# Patient Record
Sex: FEMALE | Race: OTHER | HISPANIC OR LATINO | URBAN - METROPOLITAN AREA
[De-identification: names, ages, dates, MRNs, and addresses within clinical notes are randomized per-mention and may not be internally consistent; named-entity substitution may affect disease eponyms.]

---

## 2018-02-01 ENCOUNTER — OUTPATIENT (OUTPATIENT)
Dept: OUTPATIENT SERVICES | Facility: HOSPITAL | Age: 23
LOS: 1 days | End: 2018-02-01
Payer: MEDICAID

## 2018-02-01 PROCEDURE — G9001: CPT

## 2018-02-16 ENCOUNTER — EMERGENCY (EMERGENCY)
Facility: HOSPITAL | Age: 23
LOS: 1 days | Discharge: HOME | End: 2018-02-16
Attending: EMERGENCY MEDICINE | Admitting: EMERGENCY MEDICINE
Payer: MEDICAID

## 2018-02-16 VITALS
DIASTOLIC BLOOD PRESSURE: 94 MMHG | TEMPERATURE: 98 F | SYSTOLIC BLOOD PRESSURE: 153 MMHG | RESPIRATION RATE: 18 BRPM | OXYGEN SATURATION: 100 % | HEART RATE: 99 BPM

## 2018-02-16 VITALS
DIASTOLIC BLOOD PRESSURE: 80 MMHG | SYSTOLIC BLOOD PRESSURE: 128 MMHG | OXYGEN SATURATION: 99 % | HEART RATE: 90 BPM | RESPIRATION RATE: 18 BRPM | TEMPERATURE: 98 F

## 2018-02-16 PROCEDURE — 99284 EMERGENCY DEPT VISIT MOD MDM: CPT | Mod: 25

## 2018-02-16 PROCEDURE — 70450 CT HEAD/BRAIN W/O DYE: CPT

## 2018-02-16 PROCEDURE — 70450 CT HEAD/BRAIN W/O DYE: CPT | Mod: 26

## 2018-02-16 RX ORDER — ACETAMINOPHEN 500 MG
975 TABLET ORAL ONCE
Refills: 0 | Status: COMPLETED | OUTPATIENT
Start: 2018-02-16 | End: 2018-02-16

## 2018-02-16 RX ORDER — OXYCODONE AND ACETAMINOPHEN 5; 325 MG/1; MG/1
1 TABLET ORAL ONCE
Refills: 0 | Status: DISCONTINUED | OUTPATIENT
Start: 2018-02-16 | End: 2018-02-16

## 2018-02-16 RX ADMIN — Medication 975 MILLIGRAM(S): at 03:25

## 2018-02-16 NOTE — ED PROVIDER NOTE - NS ED ROS FT
ROS: denies weakness, fevers/chills, nausea/vomiting, chest pain, SOB, diaphoresis, abdominal pain, back/neck pain, dysuria/hematuria, or rash  +abrasion, HA, dizziness

## 2018-02-16 NOTE — ED PROVIDER NOTE - PHYSICAL EXAMINATION
Gen: Well appearing, NAD  Head: Small [id number] hematoma to right side occiput  HEENT: MMM, Normal conjunctiva  Lung: CTAB, no rales, rhonchi or wheezing  CV: RRR, no murmurs, rubs or gallops  Abd: soft, NTND, no rebound or guarding  MSK: No CVA tenderness. No edema, no visible deformities  Neuro: No focal neurologic deficits. CN II-XII intact. Normal strength and sensation x 4  Skin: Warm and dry. Right flank abrasion, no active bleeding  Psych: normal mood and affect, A&Ox3
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
## 2018-02-20 DIAGNOSIS — R69 ILLNESS, UNSPECIFIED: ICD-10-CM

## 2018-06-18 ENCOUNTER — EMERGENCY (EMERGENCY)
Facility: HOSPITAL | Age: 23
LOS: 1 days | Discharge: HOME | End: 2018-06-18
Attending: EMERGENCY MEDICINE | Admitting: EMERGENCY MEDICINE
Payer: MEDICAID

## 2018-06-18 VITALS
RESPIRATION RATE: 16 BRPM | OXYGEN SATURATION: 99 % | DIASTOLIC BLOOD PRESSURE: 96 MMHG | SYSTOLIC BLOOD PRESSURE: 146 MMHG | HEART RATE: 113 BPM | TEMPERATURE: 98 F

## 2018-06-18 VITALS
DIASTOLIC BLOOD PRESSURE: 68 MMHG | HEART RATE: 108 BPM | SYSTOLIC BLOOD PRESSURE: 135 MMHG | RESPIRATION RATE: 18 BRPM | OXYGEN SATURATION: 100 %

## 2018-06-18 LAB
ALBUMIN SERPL ELPH-MCNC: 5 G/DL — SIGNIFICANT CHANGE UP (ref 3.3–5)
ALP SERPL-CCNC: 72 U/L — SIGNIFICANT CHANGE UP (ref 40–120)
ALT FLD-CCNC: 25 U/L — SIGNIFICANT CHANGE UP (ref 4–33)
AMPHET UR-MCNC: NEGATIVE — SIGNIFICANT CHANGE UP
APAP SERPL-MCNC: < 15 UG/ML — LOW (ref 15–25)
AST SERPL-CCNC: 32 U/L — SIGNIFICANT CHANGE UP (ref 4–32)
BARBITURATES UR SCN-MCNC: NEGATIVE — SIGNIFICANT CHANGE UP
BASE EXCESS BLDV CALC-SCNC: 0.7 MMOL/L — SIGNIFICANT CHANGE UP
BASOPHILS # BLD AUTO: 0.07 K/UL — SIGNIFICANT CHANGE UP (ref 0–0.2)
BASOPHILS NFR BLD AUTO: 0.6 % — SIGNIFICANT CHANGE UP (ref 0–2)
BENZODIAZ UR-MCNC: NEGATIVE — SIGNIFICANT CHANGE UP
BILIRUB SERPL-MCNC: 0.3 MG/DL — SIGNIFICANT CHANGE UP (ref 0.2–1.2)
BLOOD GAS VENOUS - CREATININE: 0.67 MG/DL — SIGNIFICANT CHANGE UP (ref 0.5–1.3)
BUN SERPL-MCNC: 14 MG/DL — SIGNIFICANT CHANGE UP (ref 7–23)
CALCIUM SERPL-MCNC: 9.8 MG/DL — SIGNIFICANT CHANGE UP (ref 8.4–10.5)
CANNABINOIDS UR-MCNC: NEGATIVE — SIGNIFICANT CHANGE UP
CHLORIDE BLDV-SCNC: 104 MMOL/L — SIGNIFICANT CHANGE UP (ref 96–108)
CHLORIDE SERPL-SCNC: 96 MMOL/L — LOW (ref 98–107)
CO2 SERPL-SCNC: 22 MMOL/L — SIGNIFICANT CHANGE UP (ref 22–31)
COCAINE METAB.OTHER UR-MCNC: NEGATIVE — SIGNIFICANT CHANGE UP
CREAT SERPL-MCNC: 0.67 MG/DL — SIGNIFICANT CHANGE UP (ref 0.5–1.3)
EOSINOPHIL # BLD AUTO: 0.03 K/UL — SIGNIFICANT CHANGE UP (ref 0–0.5)
EOSINOPHIL NFR BLD AUTO: 0.2 % — SIGNIFICANT CHANGE UP (ref 0–6)
ETHANOL BLD-MCNC: < 10 MG/DL — SIGNIFICANT CHANGE UP
GAS PNL BLDV: 132 MMOL/L — LOW (ref 136–146)
GLUCOSE BLDV-MCNC: 128 — HIGH (ref 70–99)
GLUCOSE SERPL-MCNC: 119 MG/DL — HIGH (ref 70–99)
HCO3 BLDV-SCNC: 25 MMOL/L — SIGNIFICANT CHANGE UP (ref 20–27)
HCT VFR BLD CALC: 40.1 % — SIGNIFICANT CHANGE UP (ref 34.5–45)
HCT VFR BLDV CALC: 43.8 % — SIGNIFICANT CHANGE UP (ref 34.5–45)
HGB BLD-MCNC: 13.5 G/DL — SIGNIFICANT CHANGE UP (ref 11.5–15.5)
HGB BLDV-MCNC: 14.3 G/DL — SIGNIFICANT CHANGE UP (ref 11.5–15.5)
IMM GRANULOCYTES # BLD AUTO: 0.05 # — SIGNIFICANT CHANGE UP
IMM GRANULOCYTES NFR BLD AUTO: 0.4 % — SIGNIFICANT CHANGE UP (ref 0–1.5)
LACTATE BLDV-MCNC: 2 MMOL/L — SIGNIFICANT CHANGE UP (ref 0.5–2)
LYMPHOCYTES # BLD AUTO: 1.86 K/UL — SIGNIFICANT CHANGE UP (ref 1–3.3)
LYMPHOCYTES # BLD AUTO: 15 % — SIGNIFICANT CHANGE UP (ref 13–44)
MCHC RBC-ENTMCNC: 31.8 PG — SIGNIFICANT CHANGE UP (ref 27–34)
MCHC RBC-ENTMCNC: 33.7 % — SIGNIFICANT CHANGE UP (ref 32–36)
MCV RBC AUTO: 94.6 FL — SIGNIFICANT CHANGE UP (ref 80–100)
METHADONE UR-MCNC: NEGATIVE — SIGNIFICANT CHANGE UP
MONOCYTES # BLD AUTO: 1.12 K/UL — HIGH (ref 0–0.9)
MONOCYTES NFR BLD AUTO: 9 % — SIGNIFICANT CHANGE UP (ref 2–14)
NEUTROPHILS # BLD AUTO: 9.28 K/UL — HIGH (ref 1.8–7.4)
NEUTROPHILS NFR BLD AUTO: 74.8 % — SIGNIFICANT CHANGE UP (ref 43–77)
NRBC # FLD: 0 — SIGNIFICANT CHANGE UP
OPIATES UR-MCNC: NEGATIVE — SIGNIFICANT CHANGE UP
OXYCODONE UR-MCNC: NEGATIVE — SIGNIFICANT CHANGE UP
PCO2 BLDV: 38 MMHG — LOW (ref 41–51)
PCP UR-MCNC: NEGATIVE — SIGNIFICANT CHANGE UP
PH BLDV: 7.43 PH — SIGNIFICANT CHANGE UP (ref 7.32–7.43)
PLATELET # BLD AUTO: 298 K/UL — SIGNIFICANT CHANGE UP (ref 150–400)
PMV BLD: 10.4 FL — SIGNIFICANT CHANGE UP (ref 7–13)
PO2 BLDV: 38 MMHG — SIGNIFICANT CHANGE UP (ref 35–40)
POTASSIUM BLDV-SCNC: 3.1 MMOL/L — LOW (ref 3.4–4.5)
POTASSIUM SERPL-MCNC: 3.2 MMOL/L — LOW (ref 3.5–5.3)
POTASSIUM SERPL-SCNC: 3.2 MMOL/L — LOW (ref 3.5–5.3)
PROT SERPL-MCNC: 8.2 G/DL — SIGNIFICANT CHANGE UP (ref 6–8.3)
RBC # BLD: 4.24 M/UL — SIGNIFICANT CHANGE UP (ref 3.8–5.2)
RBC # FLD: 12 % — SIGNIFICANT CHANGE UP (ref 10.3–14.5)
SALICYLATES SERPL-MCNC: < 5 MG/DL — LOW (ref 15–30)
SAO2 % BLDV: 67.3 % — SIGNIFICANT CHANGE UP (ref 60–85)
SODIUM SERPL-SCNC: 136 MMOL/L — SIGNIFICANT CHANGE UP (ref 135–145)
WBC # BLD: 12.41 K/UL — HIGH (ref 3.8–10.5)
WBC # FLD AUTO: 12.41 K/UL — HIGH (ref 3.8–10.5)

## 2018-06-18 PROCEDURE — 99285 EMERGENCY DEPT VISIT HI MDM: CPT

## 2018-06-18 RX ORDER — SODIUM CHLORIDE 9 MG/ML
1000 INJECTION INTRAMUSCULAR; INTRAVENOUS; SUBCUTANEOUS ONCE
Qty: 0 | Refills: 0 | Status: COMPLETED | OUTPATIENT
Start: 2018-06-18 | End: 2018-06-18

## 2018-06-18 RX ORDER — POTASSIUM CHLORIDE 20 MEQ
40 PACKET (EA) ORAL ONCE
Qty: 0 | Refills: 0 | Status: COMPLETED | OUTPATIENT
Start: 2018-06-18 | End: 2018-06-18

## 2018-06-18 RX ADMIN — SODIUM CHLORIDE 1000 MILLILITER(S): 9 INJECTION INTRAMUSCULAR; INTRAVENOUS; SUBCUTANEOUS at 18:28

## 2018-06-18 RX ADMIN — Medication 40 MILLIEQUIVALENT(S): at 20:01

## 2018-06-18 RX ADMIN — Medication 1 MILLIGRAM(S): at 19:02

## 2018-06-18 RX ADMIN — SODIUM CHLORIDE 1000 MILLILITER(S): 9 INJECTION INTRAMUSCULAR; INTRAVENOUS; SUBCUTANEOUS at 21:04

## 2018-06-18 NOTE — ED PROVIDER NOTE - OBJECTIVE STATEMENT
23 F works as , p/w anxious feeling, palpitations, and feeling jittery today. Patient states she drinks a lot over her shifts as a , sometimes a few shots an hour. She has been increasing her drinking recently because is working more, and has had intermittent episodes of palpitations and anxiety over the last week. Unclear if resolves with alcohol, but episodes seem to be more during the day when she is not drinking. Denies chest pain. No recent illnesses, fever, vomiting, diarrhea. Does endorse having two energy drinks today that are green tea based, and a detox drink that she does not know the active ingredient. No recent long trips, no leg swelling. Denies other ingestions. Denies SI/HI. 23 F works as , p/w anxious feeling, palpitations, and feeling jittery today. Patient states she drinks a lot over her shifts as a , sometimes a few shots an hour. She has been increasing her drinking recently because is working more, and has had intermittent episodes of palpitations and anxiety over the last week. Unclear if resolves with alcohol, but episodes seem to be more during the day when she is not drinking. Denies chest pain. No recent illnesses, fever, vomiting, diarrhea. Does endorse having two energy drinks today that are green tea based, and a detox drink that she does not know the active ingredient. No recent long trips, no leg swelling. Denies other ingestions. Denies SI/HI.  Attending - Agree with above.  I evaluated patient myself.  22 y/o F with mother at bedside.  Reports feeling very "anxious and jittery".  Feels like she is unable to calm down.  States prior to symptoms she drank 2 bottles of [employer] and a detox tea (unknown name).  States she works as  and drinks EtOH daily, last was last night.  Denies chest pain or shortness of breathe.  No fever or cough.  No recent illness or trauma.  States she has daily 'migraines' for which she takes Excedrin.  No abd pain, n/v.

## 2018-06-18 NOTE — ED PROVIDER NOTE - MEDICAL DECISION MAKING DETAILS
23 F with palpitations, anxiety. Exam with tachycardia and mildly elevated BP, with tremor and mild fasciculations. Will get labs, ivf, ucg, will also given ativan to tx anxiety which may be 2/2 stimulant in energy drink vs etoh withdrawal

## 2018-06-18 NOTE — ED PROVIDER NOTE - CONSTITUTIONAL, MLM
normal... Well appearing but anxious, well nourished, awake, alert, oriented to person, place, time/situation and in no apparent distress.

## 2018-06-18 NOTE — ED ADULT NURSE NOTE - CHIEF COMPLAINT QUOTE
Pt from home, c/o anxiety after ingesting large amounts of ginseng tea and doing "detox cleanse."  Pt reports feeling jittery from cleanse with intermittent palpitations.    Addendum:  abnormal EKG per MD Acuna.  Charge RN notified.

## 2018-06-18 NOTE — ED ADULT TRIAGE NOTE - CHIEF COMPLAINT QUOTE
Pt from home, c/o anxiety after ingesting large amounts of ginseng ea and doing "detox cleanse."  Pt reports feeling jittery from cleanse with intermittent palpitations. Pt from home, c/o anxiety after ingesting large amounts of ginseng ea and doing "detox cleanse."  Pt reports feeling jittery from cleanse with intermittent palpitations.    Addendum:  abnormal EKG per MD Acuna.  Charge RN notified. Pt from home, c/o anxiety after ingesting large amounts of ginseng tea and doing "detox cleanse."  Pt reports feeling jittery from cleanse with intermittent palpitations.    Addendum:  abnormal EKG per MD Acuna.  Charge RN notified.

## 2018-06-18 NOTE — ED PROVIDER NOTE - NEUROLOGICAL, MLM
Alert and oriented, no focal deficits, no motor or sensory deficits. +tremor, mild tongue fasciculations

## 2018-06-18 NOTE — ED PROVIDER NOTE - PHYSICAL EXAMINATION
ATTENDING PHYSICAL EXAM DR. Barry ***GEN - Answers questions appropriately, appears anxious, hyperalert, A+O x3 ***HEAD - NC/AT ***EYES/NOSE - PERRL, EOMI, mucous membranes moist, no discharge ***THROAT: Oral cavity and pharynx normal. No inflammation, swelling, exudate, or lesions.  ***NECK: Neck supple, non-tender without lymphadenopathy, no masses, no JVD.   ***PULMONARY - CTA b/l, symmetric breath sounds. ***CARDIAC -s1s2, tachy, no M,R,G  ***ABDOMEN - +BS, ND, NT, soft, no guarding, no rebound, no masses   ***BACK - no CVA tenderness, Normal  spine ***EXTREMITIES - symmetric pulses, 2+ dp, capillary refill < 2 seconds, no clubbing, no cyanosis, no edema ***SKIN - no rash or bruising   ***NEUROLOGIC - alert, CN 2-12 intact, reflexes nl, sensation nl, coordination nl, finger to nose nl, romberg negative, motor 5/5 RUE/LUE/RLE/LLE/EHL/Plantar flexion, no pronator drift, gait nl, ***PSYCH - insight and judgment nl, memory nl, affect nl, thought nl

## 2018-06-18 NOTE — ED ADULT NURSE NOTE - OBJECTIVE STATEMENT
Break coverage- Pt received to spot #29. AAOx4, c/o headache, dizziness and palpitations today. Pt c/o feeling anxious more than usual and states she drank 2 energy drinks this morning. Denies chest pain at this time. Pt is sinus tachycardia on cardiac monitor, hr in the 130's. Denies n/v. Denies SOB. MD parra performed at bedside. #18g IVSL placed to left ac, labs drawn and sent. Family member at bedside. no acute distress. Awaiting further plan of care.

## 2019-08-14 NOTE — ED ADULT NURSE NOTE - RESPIRATORY ASSESSMENT
Per Dr. Warden Edge, patient needs to be seen sooner than October for Neuro visit. She should ideally be seen within the next month. She will need to explore other options within [facility] or [facility]. FMLA paperwork placed for 3 months from her last loss of consciousness. Paperwork updated and faxed back today. Patient called and updated, she was given the [facility] referral's phone number to schedule an earlier appointment. WDL

## 2020-12-17 ENCOUNTER — EMERGENCY (EMERGENCY)
Facility: HOSPITAL | Age: 25
LOS: 1 days | Discharge: HOME | End: 2020-12-17
Attending: EMERGENCY MEDICINE | Admitting: EMERGENCY MEDICINE
Payer: MEDICAID

## 2020-12-17 VITALS
OXYGEN SATURATION: 100 % | SYSTOLIC BLOOD PRESSURE: 119 MMHG | TEMPERATURE: 98 F | HEART RATE: 70 BPM | DIASTOLIC BLOOD PRESSURE: 57 MMHG | RESPIRATION RATE: 16 BRPM

## 2020-12-17 VITALS
OXYGEN SATURATION: 100 % | HEART RATE: 87 BPM | RESPIRATION RATE: 18 BRPM | SYSTOLIC BLOOD PRESSURE: 141 MMHG | DIASTOLIC BLOOD PRESSURE: 78 MMHG | TEMPERATURE: 98 F

## 2020-12-17 PROBLEM — G43.909 MIGRAINE, UNSPECIFIED, NOT INTRACTABLE, WITHOUT STATUS MIGRAINOSUS: Chronic | Status: ACTIVE | Noted: 2018-06-18

## 2020-12-17 LAB
ALBUMIN SERPL ELPH-MCNC: 4.5 G/DL — SIGNIFICANT CHANGE UP (ref 3.3–5)
ALP SERPL-CCNC: 57 U/L — SIGNIFICANT CHANGE UP (ref 40–120)
ALT FLD-CCNC: 20 U/L — SIGNIFICANT CHANGE UP (ref 4–33)
ANION GAP SERPL CALC-SCNC: 10 MMOL/L — SIGNIFICANT CHANGE UP (ref 7–14)
APPEARANCE UR: CLEAR — SIGNIFICANT CHANGE UP
AST SERPL-CCNC: 20 U/L — SIGNIFICANT CHANGE UP (ref 4–32)
BASOPHILS # BLD AUTO: 0.03 K/UL — SIGNIFICANT CHANGE UP (ref 0–0.2)
BASOPHILS NFR BLD AUTO: 0.7 % — SIGNIFICANT CHANGE UP (ref 0–2)
BILIRUB SERPL-MCNC: 0.3 MG/DL — SIGNIFICANT CHANGE UP (ref 0.2–1.2)
BILIRUB UR-MCNC: NEGATIVE — SIGNIFICANT CHANGE UP
BUN SERPL-MCNC: 18 MG/DL — SIGNIFICANT CHANGE UP (ref 7–23)
CALCIUM SERPL-MCNC: 9.2 MG/DL — SIGNIFICANT CHANGE UP (ref 8.4–10.5)
CHLORIDE SERPL-SCNC: 101 MMOL/L — SIGNIFICANT CHANGE UP (ref 98–107)
CO2 SERPL-SCNC: 24 MMOL/L — SIGNIFICANT CHANGE UP (ref 22–31)
COLOR SPEC: SIGNIFICANT CHANGE UP
CREAT SERPL-MCNC: 0.69 MG/DL — SIGNIFICANT CHANGE UP (ref 0.5–1.3)
DIFF PNL FLD: NEGATIVE — SIGNIFICANT CHANGE UP
EOSINOPHIL # BLD AUTO: 0.03 K/UL — SIGNIFICANT CHANGE UP (ref 0–0.5)
EOSINOPHIL NFR BLD AUTO: 0.7 % — SIGNIFICANT CHANGE UP (ref 0–6)
GLUCOSE SERPL-MCNC: 99 MG/DL — SIGNIFICANT CHANGE UP (ref 70–99)
GLUCOSE UR QL: NEGATIVE — SIGNIFICANT CHANGE UP
HCT VFR BLD CALC: 38.1 % — SIGNIFICANT CHANGE UP (ref 34.5–45)
HGB BLD-MCNC: 12.3 G/DL — SIGNIFICANT CHANGE UP (ref 11.5–15.5)
IANC: 2.63 K/UL — SIGNIFICANT CHANGE UP (ref 1.5–8.5)
IMM GRANULOCYTES NFR BLD AUTO: 0 % — SIGNIFICANT CHANGE UP (ref 0–1.5)
KETONES UR-MCNC: ABNORMAL
LEUKOCYTE ESTERASE UR-ACNC: NEGATIVE — SIGNIFICANT CHANGE UP
LYMPHOCYTES # BLD AUTO: 1.25 K/UL — SIGNIFICANT CHANGE UP (ref 1–3.3)
LYMPHOCYTES # BLD AUTO: 29.3 % — SIGNIFICANT CHANGE UP (ref 13–44)
MCHC RBC-ENTMCNC: 30.8 PG — SIGNIFICANT CHANGE UP (ref 27–34)
MCHC RBC-ENTMCNC: 32.3 GM/DL — SIGNIFICANT CHANGE UP (ref 32–36)
MCV RBC AUTO: 95.3 FL — SIGNIFICANT CHANGE UP (ref 80–100)
MONOCYTES # BLD AUTO: 0.32 K/UL — SIGNIFICANT CHANGE UP (ref 0–0.9)
MONOCYTES NFR BLD AUTO: 7.5 % — SIGNIFICANT CHANGE UP (ref 2–14)
NEUTROPHILS # BLD AUTO: 2.63 K/UL — SIGNIFICANT CHANGE UP (ref 1.8–7.4)
NEUTROPHILS NFR BLD AUTO: 61.8 % — SIGNIFICANT CHANGE UP (ref 43–77)
NITRITE UR-MCNC: NEGATIVE — SIGNIFICANT CHANGE UP
NRBC # BLD: 0 /100 WBCS — SIGNIFICANT CHANGE UP
NRBC # FLD: 0 K/UL — SIGNIFICANT CHANGE UP
PH UR: 5.5 — SIGNIFICANT CHANGE UP (ref 5–8)
PLATELET # BLD AUTO: 221 K/UL — SIGNIFICANT CHANGE UP (ref 150–400)
POTASSIUM SERPL-MCNC: 3.8 MMOL/L — SIGNIFICANT CHANGE UP (ref 3.5–5.3)
POTASSIUM SERPL-SCNC: 3.8 MMOL/L — SIGNIFICANT CHANGE UP (ref 3.5–5.3)
PROT SERPL-MCNC: 7 G/DL — SIGNIFICANT CHANGE UP (ref 6–8.3)
PROT UR-MCNC: ABNORMAL
RBC # BLD: 4 M/UL — SIGNIFICANT CHANGE UP (ref 3.8–5.2)
RBC # FLD: 11.3 % — SIGNIFICANT CHANGE UP (ref 10.3–14.5)
SODIUM SERPL-SCNC: 135 MMOL/L — SIGNIFICANT CHANGE UP (ref 135–145)
SP GR SPEC: 1.02 — SIGNIFICANT CHANGE UP (ref 1.01–1.02)
TSH SERPL-MCNC: 1.27 UIU/ML — SIGNIFICANT CHANGE UP (ref 0.27–4.2)
UROBILINOGEN FLD QL: SIGNIFICANT CHANGE UP
WBC # BLD: 4.26 K/UL — SIGNIFICANT CHANGE UP (ref 3.8–10.5)
WBC # FLD AUTO: 4.26 K/UL — SIGNIFICANT CHANGE UP (ref 3.8–10.5)

## 2020-12-17 PROCEDURE — 99283 EMERGENCY DEPT VISIT LOW MDM: CPT

## 2020-12-17 RX ORDER — SODIUM CHLORIDE 9 MG/ML
1000 INJECTION INTRAMUSCULAR; INTRAVENOUS; SUBCUTANEOUS ONCE
Refills: 0 | Status: COMPLETED | OUTPATIENT
Start: 2020-12-17 | End: 2020-12-17

## 2020-12-17 RX ADMIN — SODIUM CHLORIDE 1000 MILLILITER(S): 9 INJECTION INTRAMUSCULAR; INTRAVENOUS; SUBCUTANEOUS at 15:59

## 2020-12-17 NOTE — ED ADULT NURSE NOTE - OBJECTIVE STATEMENT
pt received at intake rm 5 AAO x 3. pt with hx of anxiety. pt reports nausea, weakness and decreased appetite x 2 days. pt also reports feeling dehydrated. pt states while cooking this morning she began to feel palpitations associated with SOB but has resolved now. pt denies chest pain, vomiting, diarrhea, fevers, chills, cough, sick contacts. respirations even and unlabored. 20g iv placed to left ac.

## 2020-12-17 NOTE — ED PROVIDER NOTE - PHYSICAL EXAMINATION
Gen: NAD; well appearing  Head: NCAT  Eyes: EOMI, PERRLA, no conjunctival pallor, no scleral icterus  ENT: mucous membranes moist, no discharge  Neck: neck supple  Resp: CTAB, no W/R/R  CV: RRR, +S1/S2, no M/R/G  GI: Abdomen soft non-distended, NTTP, no masses  MSK: No open wounds, no bruising, no lower extremity edema  Neuro: A&Ox4, sensation nl, motor 5/5 RUE/LUE/RLE/LLE, follows commands  Ext: no edema, no deformity, warm and well-perfused  Skin: no rash or bruising

## 2020-12-17 NOTE — ED PROVIDER NOTE - ATTENDING CONTRIBUTION TO CARE
Attending Statement: I have personally seen and examined this patient. I have fully participated in the care of this patient. I have reviewed all pertinent clinical information, including history physical exam, plan and the Resident's note and agree except as noted  26yo F hx of anxiety, hx of migraines pw generalized weakness and dec po intake x one week. Feeling tired. Dec po intake, dec appetite no abdominal pain no n/v/d no cp ro sob  no dysuria Denies preg. not on OCP. hx anxiety no meds. no SI or HI. no hallucinations. Took moms clonazepam (one tab) for anxiety.   Vital signs noted. mmm nontoxic appearing female. normal S1-S2 No resp distress. able to speak in full and clear sentences. no wheeze, rales or stridor. soft nontender abdomen. no  rebound. no guarding. no sign of trauma. no CVAT no pedal edema. no calf tenderness. normal pulses bilateral feet.  plan labs urine, IVF, re assess

## 2020-12-17 NOTE — ED ADULT TRIAGE NOTE - CHIEF COMPLAINT QUOTE
Patient brought to ER by EMS from home for anxiety and weakness times 1 week. Pt has been thirsty and took a Klonazepam 5mg from her mom.

## 2020-12-17 NOTE — ED PROVIDER NOTE - NS ED ROS FT
GENERAL: No fever or chills  EYES: No change in vision  HEENT: No trouble swallowing or speaking  CARDIAC: +palpitations, No chest pain  PULMONARY: No cough or SOB  GI: No abdominal pain, no nausea or no vomiting, no diarrhea or constipation  : No changes in urination  SKIN: No rashes  NEURO: No headache, no numbness  MSK: No joint pain    Otherwise as HPI or negative.

## 2020-12-17 NOTE — ED PROVIDER NOTE - PATIENT PORTAL LINK FT
You can access the FollowMyHealth Patient Portal offered by [facility] by registering at the following website: [email or web address]. By joining [employer]’s FollowMyHealth portal, you will also be able to view your health information using other applications (apps) compatible with our system.

## 2020-12-17 NOTE — ED PROVIDER NOTE - CLINICAL SUMMARY MEDICAL DECISION MAKING FREE TEXT BOX
Marguerite WIN PGY-1: 26 yo F hx migraines, c/o general malaise and palpitations in the context of studying for finals, most c/w anxiety. Ddx to consider in this pt includes substance-induced tachycardia/palpitations vs hyperthyroid. Substance-induced palpitations seems unlikely given pt denial of caffeine or other stimulant use. Hyperthyroidism also less likely given benign PE and non-toxic appearance pt. CBC, CMP, urine pregnancy, u/a, TSH, EKG.

## 2020-12-17 NOTE — ED PROVIDER NOTE - OBJECTIVE STATEMENT
26 yo F hx migraines and anxiety, c/o general malaise, palpitations and decreased appetite x 2 days. Pt states she has been studying finals x 1 week, looks at computer screen for long hours. Reports decreased PO intake food + fluids x 2 days. Patient is concerned as she usually eats a lot while studying. She feels bloated and because of this has not been drinking enough water/fluids. She denies any SI/HI. Patient took one of her mom's clonazepam pills to calm down 1 hour ago, before ambulance arrived to pick her up. Patient has history of panic attacks. Patient states she believes she is having an anxiety episode, however wants to make sure that there is nothing wrong with her. Denies caffeine/stimulant use. Denies fever/chills, SOB, c/p.     meds: None  NKDA

## 2020-12-17 NOTE — ED PROVIDER NOTE - NSFOLLOWUPINSTRUCTIONS_ED_ALL_ED_FT
For pain or fever you can take ibuprofen (motrin, advil) or tylenol as needed, as directed on packaging.     Follow up with your primary care doctor within 5 days as directed.    If you had labs or imaging done, you were given copies of all of the available results. If anything is pending to result or pending official read, you will receive a call if results are positive.    If needed, call patient access services at [phone] to find a primary care doctor, or call at [phone] to make an appointment at the clinic.    Return to the ER for any worsening symptoms or concerns, including chest pain, shortness of breath, fever, chills.

## 2020-12-17 NOTE — ED PROVIDER NOTE - PROGRESS NOTE DETAILS
Marguerite WIN PGY-1: Pt was re-evaluated at bedside, VSS, feeling well overall. Return precautions and follow up plan with PCP and/or specialist were discussed. Time was taken to answer any questions that the patient had before providing them with discharge paperwork.
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
## 2022-08-03 NOTE — ED ADULT NURSE NOTE - PAIN RATING/NUMBER SCALE (0-10): REST
7 Cibinqo Counseling: I discussed with the patient the risks of Cibinqo therapy including but not limited to common cold, nausea, headache, cold sores, increased blood CPK levels, dizziness, UTIs, fatigue, acne, and vomitting. Live vaccines should be avoided.  This medication has been linked to serious infections; higher rate of mortality; malignancy and lymphoproliferative disorders; major adverse cardiovascular events; thrombosis; thrombocytopenia and lymphopenia; lipid elevations; and retinal detachment.

## 2025-03-14 NOTE — ED PROVIDER NOTE - PROGRESS NOTE DETAILS
Psychiatric Psychiatric Patient re-assessed.  Reports feeling much better post Ativan.  HR now 112 reg.  Will continue to observe. Pt was reassessed and feeling better, stable and ready to be discharged.